# Patient Record
Sex: FEMALE | Race: WHITE
[De-identification: names, ages, dates, MRNs, and addresses within clinical notes are randomized per-mention and may not be internally consistent; named-entity substitution may affect disease eponyms.]

---

## 2018-01-22 ENCOUNTER — HOSPITAL ENCOUNTER (OUTPATIENT)
Dept: HOSPITAL 17 - HRAD | Age: 48
End: 2018-01-22
Attending: INTERNAL MEDICINE
Payer: COMMERCIAL

## 2018-01-22 DIAGNOSIS — R10.12: ICD-10-CM

## 2018-01-22 DIAGNOSIS — D50.9: ICD-10-CM

## 2018-01-22 DIAGNOSIS — R10.13: Primary | ICD-10-CM

## 2018-01-22 PROCEDURE — A9537 TC99M MEBROFENIN: HCPCS

## 2018-01-22 PROCEDURE — 78227 HEPATOBIL SYST IMAGE W/DRUG: CPT

## 2018-01-22 NOTE — RADRPT
EXAM DATE/TIME:  01/22/2018 09:38 

 

[facility] COMPARISON:     

No previous studies available for comparison.

 

 

INDICATIONS :      

Abdominal pain, nausea and vomiting.

                       

 

DOSE:      

4.1 mCi Tc99m Mebrofenin IV 

                       

 

MEDICATION:     

1.6 mcg Cholecystokinin IV; No symptomatic response.

Cholecystokinin was administered by slow infusion over 8 minutes beginning at 60 minutes.

                       

 

MEDICAL HISTORY :     

Hypothyroidism.   

 

SURGICAL HISTORY :      

Gastric bypass.    

 

ENCOUNTER:     

Initial

 

ACUITY:     

1 month

 

PAIN SCALE:     

4/10

 

LOCATION:      

Right upper quadrant 

 

TECHNIQUE:     

Following the intravenous administration of radiotracer, dynamic sequential image were performed with
 continuous acquisition.  Time-activity curves were generated.

 

FINDINGS:     

 

HEPATIIC KINETICS:     

There is prompt uptake of radiotracer in the liver.  No focal defects are seen.  There is normal rate
 of washout from the hepatic parenchyma.

 

BILIARY CLEARANCE:     

Activity is first seen in the extrahepatic biliary system at 10 minutes.  There is normal excretion i
nto the small bowel.

 

GALLBLADDER:     

Activity is first seen in the gallbladder at 25 minutes.

 

POST CHOLECYSTOKININ:     

After Cholecystokinin administration, there is prompt emptying of the gallbladder with a 35 % ejectio
n fraction.  Common bile duct kinetics are normal and there is no evidence of biliary obstruction.

 

BILIARY ENTERIC REFLUX:     

None observed.

 

CLINICAL:     

The patient was asymptomatic after Cholecystokinin administration.

 

CONCLUSION:     

Unremarkable HIDA scan. No biliary tract obstruction.

 

 

 

 Flako Maloney MD on January 22, 2018 at 12:01           

Board Certified Radiologist.

 This report was verified electronically.